# Patient Record
Sex: FEMALE | Race: WHITE | HISPANIC OR LATINO | Employment: UNEMPLOYED | URBAN - METROPOLITAN AREA
[De-identification: names, ages, dates, MRNs, and addresses within clinical notes are randomized per-mention and may not be internally consistent; named-entity substitution may affect disease eponyms.]

---
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
## 2022-12-27 ENCOUNTER — OFFICE VISIT (OUTPATIENT)
Dept: URGENT CARE | Age: 22
End: 2022-12-27

## 2022-12-27 VITALS
HEART RATE: 74 BPM | TEMPERATURE: 96.3 F | DIASTOLIC BLOOD PRESSURE: 74 MMHG | OXYGEN SATURATION: 99 % | SYSTOLIC BLOOD PRESSURE: 114 MMHG | RESPIRATION RATE: 18 BRPM

## 2022-12-27 DIAGNOSIS — R52 BODY ACHES: Primary | ICD-10-CM

## 2022-12-27 RX ORDER — IBUPROFEN 200 MG
TABLET ORAL EVERY 6 HOURS PRN
COMMUNITY

## 2022-12-27 RX ORDER — NAPROXEN 500 MG/1
500 TABLET ORAL 2 TIMES DAILY WITH MEALS
Qty: 20 TABLET | Refills: 0 | Status: SHIPPED | OUTPATIENT
Start: 2022-12-27

## 2022-12-27 NOTE — PROGRESS NOTES
NAME: Jyothi Jackson is a 23 y o  female  : 2000    MRN: [id number]    /74   Pulse 74   Temp (!) 96 3 °F (35 7 °C)   Resp 18   SpO2 99%     Assessment and Plan   Body aches [R52]  1  Body aches  naproxen (Naprosyn) 500 mg tablet          Casandra was seen today for generalized body aches  Diagnoses and all orders for this visit:    Body aches  -     naproxen (Naprosyn) 500 mg tablet; Take 1 tablet (500 mg total) by mouth 2 (two) times a day with meals        Patient Instructions   Patient Instructions   Take med as directed   Epson salt baths  Follow up with pcp       Proceed to the nearest ER if symptoms worsen, Follow up with your PCP  Continue to social distance, wash your hands, and wear your masks  Please continue to follow the CDC  gov guidelines daily for they are subject to change on COVID-19    Chief Complaint     Chief Complaint   Patient presents with   • Generalized Body Aches     Patient relates generalized body aches/joint pain 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  Relates improves with rest on days off  Constant bending and lifting with heavy boxes  History of Present Illness     24 yo F started a job three months ago and since then has constant body aches and is bothersome  Days that she works a long day hurts more than usual  She woke up today and took motrin for he pain  She said that if she went to work she felt that she would have hurt self on job due to the body aches  Patient has no other complaints  She reports that when she is not working on the weekends and her body has time to rest in the past 3 months she feels better but that she always gets body aches after working this new job  She has been working for the past 3 months at [employer] lifting heavy boxes    Patient came in today requesting to have a week off in order for her body to rest   Discussed with patient this is chronic symptoms that she needs to follow with her family doctor however I will give her a work note for 2 days max  Patient was discouraged and wanted a week off and again discussed with phone over her family doctor  Review of Systems   Review of Systems   Constitutional: Negative  HENT: Negative  Respiratory: Negative  Cardiovascular: Negative  Gastrointestinal: Negative  Genitourinary: Negative  Musculoskeletal: Positive for myalgias  Neurological: Negative  Psychiatric/Behavioral: Negative  Current Medications       Current Outpatient Medications:   •  ibuprofen (MOTRIN) 200 mg tablet, Take by mouth every 6 (six) hours as needed for mild pain, Disp: , Rfl:   •  naproxen (Naprosyn) 500 mg tablet, Take 1 tablet (500 mg total) by mouth 2 (two) times a day with meals, Disp: 20 tablet, Rfl: 0    Current Allergies     Allergies as of 12/27/2022   • (No Known Allergies)              Past Medical History:   Diagnosis Date   • Depression    • Depression 02/06/2019   • Substance abuse ([facility] [address] )    • Wrist fracture        History reviewed  No pertinent surgical history  Family History   Problem Relation Age of Onset   • Anemia Mother    • Hypotension Mother    • No Known Problems Father    • No Known Problems Sister    • No Known Problems Brother          Medications have been verified  The following portions of the patient's history were reviewed and updated as appropriate: allergies, current medications, past family history, past medical history, past social history, past surgical history and problem list     Objective   /74   Pulse 74   Temp (!) 96 3 °F (35 7 °C)   Resp 18   SpO2 99%      Physical Exam     Physical Exam  Constitutional:       Appearance: Normal appearance  She is not ill-appearing  Cardiovascular:      Rate and Rhythm: Normal rate and regular rhythm  Heart sounds: Normal heart sounds     Musculoskeletal:      Right shoulder: Normal       Left shoulder: Normal       Right upper arm: Normal       Left upper arm: Normal  Right elbow: Normal       Left elbow: Normal       Right forearm: Normal       Left forearm: Normal       Right wrist: Normal       Left wrist: Normal       Right hand: Normal       Left hand: Normal       Comments: Patient has full range of motion of her body and states "soreness"  No worsening symptoms since she started work however does consistently overall discomfort and body aches  Neurological:      Mental Status: She is alert  Note: Portions of this record may have been created with voice recognition software  Occasional wrong word or "sound a like" substitutions may have occurred due to the inherent limitations of voice recognition software  Please read the chart carefully and recognize, using context, where substitutions have occurred  REJI Pizarro

## 2022-12-27 NOTE — LETTER
December 27, 2022     Patient: Mayito Falcon  YOB: 2000  Date of Visit: 12/27/2022      To Whom it May Concern:    Mayito Falcon is under my professional care  Luciadebbie Jhon was seen in my office on 12/27/2022  Sara Ferreira may return to work on 12/29/2022            Sincerely,          REJI Mabry        CC: No Recipients

## 2023-06-02 ENCOUNTER — TELEPHONE (OUTPATIENT)
Dept: OTHER | Facility: OTHER | Age: 23
End: 2023-06-02

## 2023-06-02 NOTE — TELEPHONE ENCOUNTER
Patient would like a call back to advise if the office is accepting new patients  She would like to make an appointment to establish care

## 2023-06-08 ENCOUNTER — OFFICE VISIT (OUTPATIENT)
Dept: URGENT CARE | Age: 23
End: 2023-06-08
Payer: COMMERCIAL

## 2023-06-08 VITALS
HEART RATE: 90 BPM | RESPIRATION RATE: 20 BRPM | TEMPERATURE: 97 F | SYSTOLIC BLOOD PRESSURE: 134 MMHG | BODY MASS INDEX: 19.39 KG/M2 | DIASTOLIC BLOOD PRESSURE: 79 MMHG | HEIGHT: 64 IN | OXYGEN SATURATION: 100 % | WEIGHT: 113.6 LBS

## 2023-06-08 DIAGNOSIS — G47.00 INSOMNIA, UNSPECIFIED TYPE: Primary | ICD-10-CM

## 2023-06-08 PROCEDURE — 99213 OFFICE O/P EST LOW 20 MIN: CPT

## 2023-06-08 NOTE — PATIENT INSTRUCTIONS
F/U with PCP tomorrow as scheduled Writer called patient in regards to referral for Neurological Surgery, states that he still needs to have his MRI. States he forgot to tell you that he needs an open MRI. Patient is wondering if you know where he can go for an open MRI. Please call patient to discuss.      Thank you

## 2023-06-08 NOTE — PROGRESS NOTES
"  [facility]        NAME: Gregg Rico is a 26 y o  female  : 2000    MRN: [id number]  DATE: 2023  TIME: 3:47 PM    Assessment and Plan   Insomnia, unspecified type [G47 00]  1  Insomnia, unspecified type          Patient with PMH significant for depression/anxiety and substance abuse , presents with c/o insomnia over past 2 nights  States when she is falling asleep she feels jolts and a sinking feeling  Sometimes has palpitations  Denies racing thoughts  Has not tried anything OTC  Has an appt with PCP tomorrow to establish care  Advised PCP is able to order labs, further evaluate and treat as needed  UC/ED are not place of medication initiation, lab checks   PT expressed understanding  Patient Instructions       Follow up with PCP as scheduled    Chief Complaint     Chief Complaint   Patient presents with   • Insomnia     Pt started two nights ago with difficulty sleeping  States she fell asleep but then feels \"sinking feeling\" in stomach and wakes up  Hx of similar sxs in past            History of Present Illness       Patient with PMH significant for depression/anxiety and substance abuse , presents with c/o insomnia over past 2 nights  States when she is falling asleep she feels jolts and a sinking feeling  Sometimes has palpitations  Denies racing thoughts  Has not tried anything OTC  Has an appt with PCP tomorrow to establish care  Advised PCP is able to order labs, further evaluate and treat as needed  UC/ED are not place of medication initiation, lab checks   PT expressed understanding  Review of Systems   Review of Systems   Constitutional: Negative for activity change and fever  Respiratory: Negative for cough  Cardiovascular: Positive for palpitations  Negative for chest pain  Neurological: Negative for syncope, light-headedness and headaches  Psychiatric/Behavioral: Positive for sleep disturbance  Negative for suicidal ideas   The patient is " "nervous/anxious  All other systems reviewed and are negative  Current Medications       Current Outpatient Medications:   •  ibuprofen (MOTRIN) 200 mg tablet, Take by mouth every 6 (six) hours as needed for mild pain (Patient not taking: Reported on 1/13/2023), Disp: , Rfl:   •  naproxen (Naprosyn) 500 mg tablet, Take 1 tablet (500 mg total) by mouth 2 (two) times a day with meals (Patient not taking: Reported on 1/13/2023), Disp: 20 tablet, Rfl: 0    Current Allergies     Allergies as of 06/08/2023   • (No Known Allergies)            The following portions of the patient's history were reviewed and updated as appropriate: allergies, current medications, past family history, past medical history, past social history, past surgical history and problem list      Past Medical History:   Diagnosis Date   • Depression    • Depression 02/06/2019   • Ear problems    • Substance abuse ([facility] [address] )    • Wrist fracture        History reviewed  No pertinent surgical history  Family History   Problem Relation Age of Onset   • Anemia Mother    • Hypotension Mother    • No Known Problems Father    • No Known Problems Sister    • No Known Problems Brother          Medications have been verified  Objective   /79 (BP Location: Right arm, Patient Position: Sitting, Cuff Size: Standard)   Pulse 90   Temp (!) 97 °F (36 1 °C) (Tympanic)   Resp 20   Ht 5' 4\" (1 626 m)   Wt 51 5 kg (113 lb 9 6 oz)   LMP 06/04/2023 (Approximate)   SpO2 100%   BMI 19 50 kg/m²   Patient's last menstrual period was 06/04/2023 (approximate)  Physical Exam     Physical Exam  Vitals reviewed  Constitutional:       Appearance: Normal appearance  Cardiovascular:      Rate and Rhythm: Normal rate and regular rhythm  Pulmonary:      Effort: Pulmonary effort is normal       Breath sounds: Normal breath sounds  Musculoskeletal:         General: Normal range of motion  Skin:     General: Skin is warm and dry        Capillary " Refill: Capillary refill takes less than 2 seconds  Neurological:      General: No focal deficit present  Mental Status: She is alert and oriented to person, place, and time

## 2023-06-11 ENCOUNTER — OFFICE VISIT (OUTPATIENT)
Dept: URGENT CARE | Age: 23
End: 2023-06-11
Payer: COMMERCIAL

## 2023-06-11 VITALS
SYSTOLIC BLOOD PRESSURE: 120 MMHG | OXYGEN SATURATION: 99 % | WEIGHT: 113 LBS | BODY MASS INDEX: 19.29 KG/M2 | DIASTOLIC BLOOD PRESSURE: 70 MMHG | HEART RATE: 64 BPM | HEIGHT: 64 IN | RESPIRATION RATE: 18 BRPM | TEMPERATURE: 98 F

## 2023-06-11 DIAGNOSIS — F41.9 ANXIETY: Primary | ICD-10-CM

## 2023-06-11 DIAGNOSIS — G47.00 INSOMNIA, UNSPECIFIED TYPE: ICD-10-CM

## 2023-06-11 PROCEDURE — 99213 OFFICE O/P EST LOW 20 MIN: CPT

## 2023-06-11 NOTE — PATIENT INSTRUCTIONS
It is important to follow up with PCP as soon as possible for further evaluation and treatment of symptoms  Proceed to ER if symptoms worsen  Anxiety   AMBULATORY CARE:   Anxiety  is a condition that causes you to feel extremely worried or nervous  The feelings are so strong that they can cause problems with your daily activities or sleep  Anxiety may be triggered by something you fear, or it may happen without a cause  Family or work stress, smoking, caffeine, and alcohol can increase your risk for anxiety  Certain medicines or health conditions can also increase your risk  Anxiety can become a long-term condition if it is not managed or treated  Common signs and symptoms that may occur with anxiety:   Fatigue or muscle tightness    Shaking, restlessness, or irritability    Problems focusing    Trouble sleeping    Feeling jumpy, easily startled, or dizzy    Rapid heartbeat or shortness of breath    Call your local emergency number (911 in the [address]) if:   You have chest pain, tightness, or heaviness that may spread to your shoulders, arms, jaw, neck, or back  You feel like hurting yourself or someone else  Call your doctor if:   Your symptoms get worse or do not get better with treatment  Your anxiety keeps you from doing your regular daily activities  You have new symptoms since your last visit  You have questions or concerns about your condition or care  Treatment for anxiety  may include medicines to help you feel calm and relaxed, and decrease your symptoms  Medicines are usually given together with therapy or other treatments  Manage anxiety:   Talk to someone about your anxiety  Your healthcare provider may suggest counseling  Cognitive behavioral therapy can help you understand and change how you react to events that trigger your symptoms  You might feel more comfortable talking with a friend or family member about your anxiety   Choose someone you know will be supportive and encouraging  Find ways to relax  Activities such as exercise, meditation, or listening to music can help you relax  Spend time with friends, or do things you enjoy  Practice deep breathing  Deep breathing can help you relax when you feel anxious  Focus on taking slow, deep breaths several times a day, or during an anxiety attack  Breathe in through your nose and out through your mouth  Create a regular sleep routine  Regular sleep can help you feel calmer during the day  Go to sleep and wake up at the same times every day  Do not watch television or use the computer right before bed  Your room should be comfortable, dark, and quiet  Eat a variety of healthy foods  Healthy foods include fruits, vegetables, low-fat dairy products, lean meats, fish, whole-grain breads, and cooked beans  Healthy foods can help you feel less anxious and have more energy  Exercise regularly  Exercise can increase your energy level  Exercise may also lift your mood and help you sleep better  Your healthcare provider can help you create an exercise plan  Do not smoke  Nicotine and other chemicals in cigarettes and cigars can increase anxiety  Ask your healthcare provider for information if you currently smoke and need help to quit  E-cigarettes or smokeless tobacco still contain nicotine  Talk to your healthcare provider before you use these products  Do not have caffeine  Caffeine can make your symptoms worse  Do not have foods or drinks that are meant to increase your energy level  Limit or do not drink alcohol  Ask your healthcare provider if alcohol is safe for you  You may not be able to drink alcohol if you take certain anxiety or depression medicines  Limit alcohol to 1 drink per day if you are a woman  Limit alcohol to 2 drinks per day if you are a man  A drink of alcohol is 12 ounces of beer, 5 ounces of wine, or 1½ ounces of liquor  Do not use drugs  Drugs can make your anxiety worse  It can also make anxiety hard to manage  Talk to your healthcare provider if you use drugs and want help to quit  Follow up with your doctor within 2 weeks or as directed:  Write down your questions so you remember to ask them during your visits  © [employer] 2022 Information is for End User's use only and may not be sold, redistributed or otherwise used for commercial purposes  The above information is an  only  It is not intended as medical advice for individual conditions or treatments  Talk to your doctor, nurse or pharmacist before following any medical regimen to see if it is safe and effective for you

## 2023-06-11 NOTE — LETTER
June 11, 2023     Patient: Ngoc Hernandez   YOB: 2000   Date of Visit: 6/11/2023       To Whom it May Concern:    Ngoc Hernandez was seen in my clinic on 6/11/2023  She may return to work on 6/13/23   If you have any questions or concerns, please don't hesitate to call           Sincerely,          Candie Salcido PA-C        CC: No Recipients

## 2023-06-11 NOTE — PROGRESS NOTES
"  [facility]        NAME: Elen Garcia is a 26 y o  female  : 2000    MRN: [id number]  DATE: 2023  TIME: 10:57 AM    Assessment and Plan   Anxiety [F41 9]  1  Anxiety        2  Insomnia, unspecified type            Patient seen twice ([facility] on 23 and [facility] on 23) for these symptoms  Workup including EKG and labs did not show any significant abnormalities  Patient was supposed to have an initial visit with a PCP on 23, but she missed the appointment  Symptoms have not worsened since onset/being seen at ER  Advised patient that it is important for her to establish care with a PCP for further evaluation and treatment of her symptoms  Patient expressed understanding  Patient Instructions     It is important to follow up with PCP as soon as possible for further evaluation and treatment of symptoms  Proceed to ER if symptoms worsen  Chief Complaint     Chief Complaint   Patient presents with   • Dizziness     Episodes of dizziness, weakness, chest tightness after sleeping x 5 days         History of Present Illness       Patient states about 5 days ago she started with the following symptoms - decreased sleep, heart palpitations, burning of skin, shaking, feelings of heaviness, impending doom  The most intense it gets is when she is trying to go to sleep, preventing her from falling asleep  She tries to get up and walk around, allowing some relief of symptoms and then sometimes she is able to fall back to sleep  She has been seen at [facility] and [facility] in the ER in the past 3 days for the same symptoms  States medications were given in the ER for anxiety without much relief  Her symptoms are not any worse today but they are still occurring  She was supposed to have an initial care visit with a PCP on 23 but missed the appointment \"because the pill they gave me (in the ER) knocked me out for like 7 hours\"        Review of Systems   Review of " "Systems   Cardiovascular: Positive for chest pain (burning sensation of skin) and palpitations  Neurological: Positive for dizziness  Psychiatric/Behavioral: Positive for sleep disturbance (decreased - 7 hours after ER visit after she took medication)  Negative for hallucinations and suicidal ideas (patient denies suicidal ideation/plan, thoughts of harming herself)  The patient is nervous/anxious  Current Medications       Current Outpatient Medications:   •  ibuprofen (MOTRIN) 200 mg tablet, Take by mouth every 6 (six) hours as needed for mild pain (Patient not taking: Reported on 1/13/2023), Disp: , Rfl:   •  naproxen (Naprosyn) 500 mg tablet, Take 1 tablet (500 mg total) by mouth 2 (two) times a day with meals (Patient not taking: Reported on 1/13/2023), Disp: 20 tablet, Rfl: 0    Current Allergies     Allergies as of 06/11/2023   • (No Known Allergies)            The following portions of the patient's history were reviewed and updated as appropriate: allergies, current medications, past family history, past medical history, past social history, past surgical history and problem list      Past Medical History:   Diagnosis Date   • Depression    • Depression 02/06/2019   • Ear problems    • Substance abuse ([facility] [address] )    • Wrist fracture        History reviewed  No pertinent surgical history  Family History   Problem Relation Age of Onset   • Anemia Mother    • Hypotension Mother    • No Known Problems Father    • No Known Problems Sister    • No Known Problems Brother          Medications have been verified  Objective   /70   Pulse 64   Temp 98 °F (36 7 °C)   Resp 18   Ht 5' 4\" (1 626 m)   Wt 51 3 kg (113 lb)   LMP 06/04/2023 (Approximate)   SpO2 99%   BMI 19 40 kg/m²        Physical Exam     Physical Exam  Vitals and nursing note reviewed  Constitutional:       General: She is not in acute distress  Appearance: Normal appearance  She is not ill-appearing     Cardiovascular: " Rate and Rhythm: Normal rate and regular rhythm  Pulses: Normal pulses  Heart sounds: Normal heart sounds  Pulmonary:      Effort: Pulmonary effort is normal       Breath sounds: Normal breath sounds  Neurological:      Mental Status: She is alert  Psychiatric:         Attention and Perception: Attention normal          Mood and Affect: Mood is anxious  Speech: Speech is rapid and pressured  Behavior: Behavior is not agitated or aggressive  Thought Content: Thought content does not include homicidal or suicidal ideation  Thought content does not include homicidal or suicidal plan

## 2023-06-13 ENCOUNTER — OFFICE VISIT (OUTPATIENT)
Dept: FAMILY MEDICINE CLINIC | Facility: CLINIC | Age: 23
End: 2023-06-13
Payer: COMMERCIAL

## 2023-06-13 VITALS
WEIGHT: 111 LBS | SYSTOLIC BLOOD PRESSURE: 112 MMHG | DIASTOLIC BLOOD PRESSURE: 60 MMHG | OXYGEN SATURATION: 99 % | BODY MASS INDEX: 18.95 KG/M2 | HEART RATE: 77 BPM | HEIGHT: 64 IN | RESPIRATION RATE: 16 BRPM | TEMPERATURE: 97.6 F

## 2023-06-13 DIAGNOSIS — R00.2 PALPITATIONS: ICD-10-CM

## 2023-06-13 DIAGNOSIS — Z00.00 HEALTHCARE MAINTENANCE: ICD-10-CM

## 2023-06-13 DIAGNOSIS — F41.9 ANXIETY: Primary | ICD-10-CM

## 2023-06-13 DIAGNOSIS — R06.02 SOB (SHORTNESS OF BREATH): ICD-10-CM

## 2023-06-13 DIAGNOSIS — R42 DIZZINESS: ICD-10-CM

## 2023-06-13 PROCEDURE — 99204 OFFICE O/P NEW MOD 45 MIN: CPT | Performed by: NURSE PRACTITIONER

## 2023-06-13 PROCEDURE — 99395 PREV VISIT EST AGE 18-39: CPT | Performed by: NURSE PRACTITIONER

## 2023-06-13 RX ORDER — ALBUTEROL SULFATE 90 UG/1
2 AEROSOL, METERED RESPIRATORY (INHALATION) EVERY 6 HOURS PRN
Qty: 8.5 G | Refills: 1 | Status: SHIPPED | OUTPATIENT
Start: 2023-06-13

## 2023-06-13 RX ORDER — HYDROXYZINE PAMOATE 25 MG/1
25 CAPSULE ORAL 3 TIMES DAILY PRN
Qty: 60 CAPSULE | Refills: 0 | Status: SHIPPED | OUTPATIENT
Start: 2023-06-13

## 2023-06-13 NOTE — PROGRESS NOTES
Name: Raymond Sotomayor      : 2000      MRN: [id number]  Encounter Provider: REJI Gamble  Encounter Date: 2023   Encounter department: [address] [place] [address]  Anxiety  Assessment & Plan:  - Suspect majority of symptoms are related to anxiety  - Prescription sent for hydroxyzine as needed  Discussed potential side effects   - Recommend follow up in 1 month  Orders:  -     hydrOXYzine pamoate (VISTARIL) 25 mg capsule; Take 1 capsule (25 mg total) by mouth 3 (three) times a day as needed for anxiety    2  Dizziness  Assessment & Plan:  - Will obtain labs and continue to follow up  Orders:  -     CBC and differential; Future  -     Comprehensive metabolic panel; Future  -     Lipid Panel with Direct LDL reflex; Future  -     TSH, 3rd generation with Free T4 reflex; Future    3  Palpitations  Assessment & Plan:  - Will obtain labs and 48 hour holter monitor   - Recommend follow up in 1 month  Orders:  -     CBC and differential; Future  -     TSH, 3rd generation with Free T4 reflex; Future  -     Iron Panel (Includes Ferritin, Iron Sat%, Iron, and TIBC); Future  -     Holter monitor; Future; Expected date: 2023    4  SOB (shortness of breath)  Assessment & Plan:  - Prescription sent for albuterol inhaler as needed  - Will continue to monitor  Orders:  -     albuterol (ProAir HFA) 90 mcg/act inhaler; Inhale 2 puffs every 6 (six) hours as needed for wheezing    5  Healthcare maintenance  Assessment & Plan:  - Reviewed immunizations and screenings  - Discussed regular dental and vision exams    - Referred to GYN for cervical cancer screening    - Routine labs ordered  Orders:  -     CBC and differential; Future  -     Comprehensive metabolic panel; Future  -     Lipid Panel with Direct LDL reflex; Future  -     TSH, 3rd generation with Free T4 reflex;  Future           Subjective     Patient presents to office today to "establish care  She has a PMH of anxiety and depression  She takes no prescription medications on a daily basis  She has complaints today of shortness of breath, palpitations, and dizziness  States she has been waking up from sleeping with a \"sinking feeling\" and palpitations  Feels like her heart is beating fast in her chest  Also gets very hot  One episode she started shaking  Improved after she ate some fruit and drank water  States she is getting lightheaded and dizzy and getting a sensation in her chest like something is wrong  She does have a history of anxiety but feels it is not associated with her symptoms  She has never felt like this in the past  She was seen in the ER for similar symptoms  Workup was unremarkable  She was given lorazepam which she states did not improve any of her symptoms  Symptoms tend to occur and are worse at night when she is trying to sleep  Also complains of some shortness of breath  Denies any history of asthma  States it was worse last week when the air quality was poor  Seems to be somewhat improving  Review of Systems   Constitutional: Negative for fatigue and fever  HENT: Negative for congestion, rhinorrhea and trouble swallowing  Eyes: Negative for visual disturbance  Respiratory: Positive for shortness of breath  Negative for cough  Cardiovascular: Positive for palpitations  Negative for chest pain  Gastrointestinal: Negative for abdominal pain and blood in stool  Endocrine: Negative for cold intolerance and heat intolerance  Genitourinary: Negative for difficulty urinating, dysuria and frequency  Musculoskeletal: Negative for gait problem  Skin: Negative for rash  Neurological: Positive for dizziness and light-headedness  Negative for syncope and headaches  Hematological: Negative for adenopathy  Psychiatric/Behavioral: Negative for behavioral problems  The patient is not nervous/anxious          Past Medical History:   Diagnosis Date   • " Depression    • Depression 02/06/2019   • Ear problems    • Substance abuse [facility])    • Wrist fracture      History reviewed  No pertinent surgical history  Family History   Problem Relation Age of Onset   • Anemia Mother    • Hypotension Mother    • No Known Problems Father    • No Known Problems Sister    • No Known Problems Brother      Social History     Socioeconomic History   • Marital status: Single     Spouse name: None   • Number of children: None   • Years of education: None   • Highest education level: 11th grade   Occupational History   • None   Tobacco Use   • Smoking status: Never     Passive exposure: Yes   • Smokeless tobacco: Never   Vaping Use   • Vaping Use: Never used   Substance and Sexual Activity   • Alcohol use: Never   • Drug use: Never   • Sexual activity: Never     Partners: Male     Comment: cell [phone]   Other Topics Concern   • None   Social History Narrative   • None     Social Determinants of Health     Financial Resource Strain: Medium Risk (3/27/2019)    Overall Financial Resource Strain (CARDIA)    • Difficulty of Paying Living Expenses: Somewhat hard   Food Insecurity: No Food Insecurity (3/27/2019)    Hunger Vital Sign    • Worried About Running Out of Food in the Last Year: Never true    • Ran Out of Food in the Last Year: Never true   Transportation Needs: No Transportation Needs (3/27/2019)    PRAPARE - Transportation    • Lack of Transportation (Medical): No    • Lack of Transportation (Non-Medical): No   Physical Activity: Not on file   Stress: No Stress Concern Present (3/27/2019)    [address]    • Feeling of Stress :  Only a little   Social Connections: Socially Isolated (3/27/2019)    Social Connection and Isolation Panel [NHANES]    • Frequency of Communication with Friends and Family: More than three times a week    • Frequency of Social Gatherings with Friends and Family: More than three times a "week    • Attends Hindu Services: Never    • Active Member of Clubs or Organizations: Not on file    • Attends Club or Organization Meetings: Never    • Marital Status: Never    Intimate Partner Violence: Not At Risk (3/27/2019)    Humiliation, Afraid, Rape, and Kick questionnaire    • Fear of Current or Ex-Partner: No    • Emotionally Abused: No    • Physically Abused: No    • Sexually Abused: No   Housing Stability: Not on file     Current Outpatient Medications on File Prior to Visit   Medication Sig   • ibuprofen (MOTRIN) 200 mg tablet Take by mouth every 6 (six) hours as needed for mild pain (Patient not taking: Reported on 1/13/2023)   • naproxen (Naprosyn) 500 mg tablet Take 1 tablet (500 mg total) by mouth 2 (two) times a day with meals (Patient not taking: Reported on 1/13/2023)     No Known Allergies  Immunization History   Administered Date(s) Administered   • DTP 10/29/2003, 01/21/2004, 04/01/2004, 04/05/2005, 07/05/2006   • DTaP 5 04/15/2003, 10/29/2003, 01/21/2004, 04/01/2004, 07/05/2006   • HPV Quadrivalent 02/06/2013, 10/21/2014   • HPV9 10/05/2016   • Hep A, adult 07/25/2007, 09/25/2007   • Hep A, ped/adol, 2 dose 07/05/2006, 09/25/2007   • Hep B, adult 2000, 10/29/2003, 01/21/2004   • Hepatitis A 07/25/2006   • Hib (PRP-OMP) 10/29/2003, 04/05/2005   • IPV 10/29/2003, 01/21/2004, 04/01/2004, 07/05/2006   • Influenza, nasal 10/21/2014   • Influenza, seasonal, injectable 11/08/2012   • MMR 10/29/2003, 01/21/2004, 07/05/2006   • Meningococcal MCV4P 02/06/2013, 03/27/2019   • Meningococcal, Unknown Serogroups 02/06/2013   • Pneumococcal Conjugate PCV 7 01/21/2004, 04/05/2004   • Tdap 02/06/2013   • Tuberculin Skin Test-PPD Intradermal 04/05/2005, 09/25/2007   • Varicella 01/21/2004, 09/25/2007       Objective     /60 (BP Location: Left arm, Patient Position: Sitting, Cuff Size: Adult)   Pulse 77   Temp 97 6 °F (36 4 °C) (Tympanic)   Resp 16   Ht 5' 4\" (1 626 m)   Wt 50 3 kg " (111 lb)   LMP 06/04/2023 (Approximate)   SpO2 99%   BMI 19 05 kg/m²     Physical Exam  Vitals and nursing note reviewed  Constitutional:       General: She is not in acute distress  Appearance: Normal appearance  She is not ill-appearing  HENT:      Head: Normocephalic and atraumatic  Right Ear: Tympanic membrane, ear canal and external ear normal       Left Ear: Tympanic membrane, ear canal and external ear normal       Nose: Nose normal       Mouth/Throat:      Mouth: Mucous membranes are moist    Eyes:      Extraocular Movements: Extraocular movements intact  Conjunctiva/sclera: Conjunctivae normal       Pupils: Pupils are equal, round, and reactive to light  Cardiovascular:      Rate and Rhythm: Normal rate and regular rhythm  Heart sounds: Normal heart sounds  Pulmonary:      Effort: Pulmonary effort is normal       Breath sounds: Normal breath sounds  Abdominal:      General: Bowel sounds are normal       Palpations: Abdomen is soft  Musculoskeletal:         General: Normal range of motion  Cervical back: Normal range of motion  Lymphadenopathy:      Cervical: No cervical adenopathy  Skin:     General: Skin is warm and dry  Neurological:      Mental Status: She is alert and oriented to person, place, and time  Cranial Nerves: No cranial nerve deficit  Psychiatric:         Mood and Affect: Mood is anxious           Behavior: Behavior normal        Randeen Moritz, CRNP

## 2023-06-13 NOTE — ASSESSMENT & PLAN NOTE
- Suspect majority of symptoms are related to anxiety  - Prescription sent for hydroxyzine as needed  Discussed potential side effects   - Recommend follow up in 1 month

## 2023-06-13 NOTE — LETTER
June 13, 2023     Patient: Bk Salmeron  YOB: 2000  Date of Visit: 6/13/2023      To Whom it May Concern:    Bk Salmeron is under my professional care  Kerrie Luciano was seen in my office on 6/13/2023  Kerrie Luciano may return to work on 6/15/2023   If you have any questions or concerns, please don't hesitate to call           Sincerely,          REJI Dumont        CC: No Recipients

## 2023-06-13 NOTE — ASSESSMENT & PLAN NOTE
- Reviewed immunizations and screenings  - Discussed regular dental and vision exams    - Referred to GYN for cervical cancer screening    - Routine labs ordered

## 2023-06-14 ENCOUNTER — APPOINTMENT (OUTPATIENT)
Dept: LAB | Age: 23
End: 2023-06-14
Payer: COMMERCIAL

## 2023-06-14 DIAGNOSIS — Z00.00 HEALTHCARE MAINTENANCE: ICD-10-CM

## 2023-06-14 DIAGNOSIS — R00.2 PALPITATIONS: ICD-10-CM

## 2023-06-14 DIAGNOSIS — R42 DIZZINESS: ICD-10-CM

## 2023-06-14 LAB
ALBUMIN SERPL BCP-MCNC: 4.4 G/DL (ref 3.5–5)
ALP SERPL-CCNC: 49 U/L (ref 46–116)
ALT SERPL W P-5'-P-CCNC: 20 U/L (ref 12–78)
ANION GAP SERPL CALCULATED.3IONS-SCNC: 4 MMOL/L (ref 4–13)
AST SERPL W P-5'-P-CCNC: 14 U/L (ref 5–45)
BASOPHILS # BLD AUTO: 0.05 THOUSANDS/ÂΜL (ref 0–0.1)
BASOPHILS NFR BLD AUTO: 1 % (ref 0–1)
BILIRUB SERPL-MCNC: 0.3 MG/DL (ref 0.2–1)
BUN SERPL-MCNC: 12 MG/DL (ref 5–25)
CALCIUM SERPL-MCNC: 9.9 MG/DL (ref 8.3–10.1)
CHLORIDE SERPL-SCNC: 106 MMOL/L (ref 96–108)
CHOLEST SERPL-MCNC: 143 MG/DL
CO2 SERPL-SCNC: 25 MMOL/L (ref 21–32)
CREAT SERPL-MCNC: 0.75 MG/DL (ref 0.6–1.3)
EOSINOPHIL # BLD AUTO: 0.08 THOUSAND/ÂΜL (ref 0–0.61)
EOSINOPHIL NFR BLD AUTO: 1 % (ref 0–6)
ERYTHROCYTE [DISTWIDTH] IN BLOOD BY AUTOMATED COUNT: 12.8 % (ref 11.6–15.1)
FERRITIN SERPL-MCNC: 36 NG/ML (ref 11–307)
GFR SERPL CREATININE-BSD FRML MDRD: 114 ML/MIN/1.73SQ M
GLUCOSE P FAST SERPL-MCNC: 89 MG/DL (ref 65–99)
HCT VFR BLD AUTO: 44.4 % (ref 34.8–46.1)
HDLC SERPL-MCNC: 65 MG/DL
HGB BLD-MCNC: 14.1 G/DL (ref 11.5–15.4)
IMM GRANULOCYTES # BLD AUTO: 0.02 THOUSAND/UL (ref 0–0.2)
IMM GRANULOCYTES NFR BLD AUTO: 0 % (ref 0–2)
IRON SATN MFR SERPL: 13 % (ref 15–50)
IRON SERPL-MCNC: 53 UG/DL (ref 50–170)
LDLC SERPL CALC-MCNC: 69 MG/DL (ref 0–100)
LYMPHOCYTES # BLD AUTO: 3.12 THOUSANDS/ÂΜL (ref 0.6–4.47)
LYMPHOCYTES NFR BLD AUTO: 33 % (ref 14–44)
MCH RBC QN AUTO: 27.5 PG (ref 26.8–34.3)
MCHC RBC AUTO-ENTMCNC: 31.8 G/DL (ref 31.4–37.4)
MCV RBC AUTO: 87 FL (ref 82–98)
MONOCYTES # BLD AUTO: 0.63 THOUSAND/ÂΜL (ref 0.17–1.22)
MONOCYTES NFR BLD AUTO: 7 % (ref 4–12)
NEUTROPHILS # BLD AUTO: 5.59 THOUSANDS/ÂΜL (ref 1.85–7.62)
NEUTS SEG NFR BLD AUTO: 58 % (ref 43–75)
NRBC BLD AUTO-RTO: 0 /100 WBCS
PLATELET # BLD AUTO: 293 THOUSANDS/UL (ref 149–390)
PMV BLD AUTO: 11.5 FL (ref 8.9–12.7)
POTASSIUM SERPL-SCNC: 3.7 MMOL/L (ref 3.5–5.3)
PROT SERPL-MCNC: 7.9 G/DL (ref 6.4–8.4)
RBC # BLD AUTO: 5.13 MILLION/UL (ref 3.81–5.12)
SODIUM SERPL-SCNC: 135 MMOL/L (ref 135–147)
TIBC SERPL-MCNC: 411 UG/DL (ref 250–450)
TRIGL SERPL-MCNC: 44 MG/DL
TSH SERPL DL<=0.05 MIU/L-ACNC: 1.77 UIU/ML (ref 0.45–4.5)
WBC # BLD AUTO: 9.49 THOUSAND/UL (ref 4.31–10.16)

## 2023-06-14 PROCEDURE — 82728 ASSAY OF FERRITIN: CPT

## 2023-06-14 PROCEDURE — 36415 COLL VENOUS BLD VENIPUNCTURE: CPT

## 2023-06-14 PROCEDURE — 83550 IRON BINDING TEST: CPT

## 2023-06-14 PROCEDURE — 85025 COMPLETE CBC W/AUTO DIFF WBC: CPT

## 2023-06-14 PROCEDURE — 80053 COMPREHEN METABOLIC PANEL: CPT

## 2023-06-14 PROCEDURE — 80061 LIPID PANEL: CPT

## 2023-06-14 PROCEDURE — 83540 ASSAY OF IRON: CPT

## 2023-06-14 PROCEDURE — 84443 ASSAY THYROID STIM HORMONE: CPT

## 2023-06-15 ENCOUNTER — TELEPHONE (OUTPATIENT)
Dept: FAMILY MEDICINE CLINIC | Facility: CLINIC | Age: 23
End: 2023-06-15

## 2023-06-15 NOTE — TELEPHONE ENCOUNTER
Was prescribed inhaler and vistaril  The soonest they can get her a holter monitor is June 20 but she feels its heart related not just her anxiety so not sure if there is anything we can do sooner  Was giving note excusing her today and   Was supposed to be going into work today, she say she going to try to do it but still feeling out of breath   Please call pt to advise

## 2023-06-15 NOTE — TELEPHONE ENCOUNTER
Pt states she took the hydroxyzine and ativan as well as inhaler   She feels her symptoms are getting worse and will go to ER  Pt  Is asking if Rhonda Gonzalez will fill out FMLA papers for days she may miss  Please advise

## 2023-06-15 NOTE — TELEPHONE ENCOUNTER
She was seen in ER for same symptoms 6/9 and workup  was unremarkable  I suggest hydroxyzine as needed for anxiety  If not working she can take ativan as prescribed when she was in the ER  Also recommend albuterol inhaler as needed for SOB  If no improvement and she feels strongly that her symptoms are not related to anxiety I recommend proceeding to ER

## 2023-06-16 ENCOUNTER — TELEPHONE (OUTPATIENT)
Dept: FAMILY MEDICINE CLINIC | Facility: CLINIC | Age: 23
End: 2023-06-16

## 2023-06-16 NOTE — TELEPHONE ENCOUNTER
----- [facility] at 6/14/2023  3:01 PM EDT -----  All labs are normal

## 2023-06-20 ENCOUNTER — OFFICE VISIT (OUTPATIENT)
Dept: FAMILY MEDICINE CLINIC | Facility: CLINIC | Age: 23
End: 2023-06-20
Payer: COMMERCIAL

## 2023-06-20 VITALS
BODY MASS INDEX: 18.95 KG/M2 | OXYGEN SATURATION: 95 % | HEIGHT: 64 IN | DIASTOLIC BLOOD PRESSURE: 70 MMHG | SYSTOLIC BLOOD PRESSURE: 120 MMHG | RESPIRATION RATE: 16 BRPM | WEIGHT: 111 LBS | TEMPERATURE: 97.4 F | HEART RATE: 75 BPM

## 2023-06-20 DIAGNOSIS — R00.2 PALPITATIONS: ICD-10-CM

## 2023-06-20 DIAGNOSIS — R06.02 SOB (SHORTNESS OF BREATH): Primary | ICD-10-CM

## 2023-06-20 DIAGNOSIS — R42 DIZZINESS: ICD-10-CM

## 2023-06-20 DIAGNOSIS — F41.9 ANXIETY: ICD-10-CM

## 2023-06-20 PROCEDURE — 99214 OFFICE O/P EST MOD 30 MIN: CPT | Performed by: NURSE PRACTITIONER

## 2023-06-20 NOTE — PROGRESS NOTES
Name: Opal Lozada      : 2000      MRN: [id number]  Encounter Provider: REJI Arenas  Encounter Date: 2023   Encounter department: [address] [place] [address]  SOB (shortness of breath)  Assessment & Plan:  - No improvement with albuterol inhaler   - Will obtain echo  - Consider PFTs  - Will continue to follow up  Orders:  -     Echo complete w/ contrast if indicated; Future; Expected date: 2023    2  Palpitations  Assessment & Plan:  - 48 hour holter monitor ordered  She is picking up today  - Will continue to follow up  3  Dizziness  Assessment & Plan:  - Lab workup unremarkable  - Encouraged adequate oral hydration and dietary intake  - Will check 48 hour holter monitor and echo  - Will continue to follow up  4  Anxiety  Assessment & Plan:  - Continue hydroxyzine as needed  - Can also take Ativan 0 5 mg PRN which was prescribed in ER    - Will continue to follow up  Subjective     Patient presents today for follow up  She still has complaints of weakness, dizziness, palpitations, shortness of breath, and trouble sleeping  She was seen in the ER for her complaints with unremarkable workup  She was given hydroxyzine for possible anxiety  States she took it and it did help her fall asleep but she still woke up with weakness, shortness of breath, and overall feeling of something being wrong  She has been using melatonin and tart cherry juice to help her sleep instead of the hydroxyzine  Has not used PRN Ativan that was prescribed in ER  She has a holter monitor scheduled for today  She used the albuterol that was given to her for her SOB and states it did not help her  She denies any history of asthma  Denies any wheezing  Does not think any of her symptoms are related to anxiety because she has had anxiety in the past and has been able to cope with it well   She has been missing work because of her symptoms  Is requesting paperwork be filled out  Review of Systems   Constitutional: Negative for fatigue and fever  HENT: Negative for trouble swallowing  Eyes: Negative for visual disturbance  Respiratory: Positive for shortness of breath  Negative for cough and wheezing  Cardiovascular: Positive for palpitations  Negative for chest pain  Gastrointestinal: Negative for abdominal pain and blood in stool  Endocrine: Negative for cold intolerance and heat intolerance  Genitourinary: Negative for difficulty urinating and dysuria  Musculoskeletal: Negative for gait problem  Skin: Negative for rash  Neurological: Positive for dizziness and weakness  Negative for syncope and headaches  Hematological: Negative for adenopathy  Psychiatric/Behavioral: Negative for behavioral problems  Past Medical History:   Diagnosis Date   • Depression    • Depression 02/06/2019   • Ear problems    • Substance abuse ([facility] [address] )    • Wrist fracture      History reviewed  No pertinent surgical history  Family History   Problem Relation Age of Onset   • Anemia Mother    • Hypotension Mother    • No Known Problems Father    • No Known Problems Sister    • No Known Problems Brother      Social History     Socioeconomic History   • Marital status: Single     Spouse name: None   • Number of children: None   • Years of education: None   • Highest education level: 11th grade   Occupational History   • None   Tobacco Use   • Smoking status: Never     Passive exposure:  Yes   • Smokeless tobacco: Never   Vaping Use   • Vaping Use: Never used   Substance and Sexual Activity   • Alcohol use: Never   • Drug use: Never   • Sexual activity: Never     Partners: Male     Comment: cell [phone]   Other Topics Concern   • None   Social History Narrative   • None     Social Determinants of Health     Financial Resource Strain: Medium Risk (3/27/2019)    Overall Financial Resource Strain (CARDIA)    • Difficulty of Paying Living Expenses: Somewhat hard   Food Insecurity: No Food Insecurity (3/27/2019)    Hunger Vital Sign    • Worried About Running Out of Food in the Last Year: Never true    • Ran Out of Food in the Last Year: Never true   Transportation Needs: No Transportation Needs (3/27/2019)    PRAPARE - Transportation    • Lack of Transportation (Medical): No    • Lack of Transportation (Non-Medical): No   Physical Activity: Not on file   Stress: No Stress Concern Present (3/27/2019)    [address]    • Feeling of Stress :  Only a little   Social Connections: Socially Isolated (3/27/2019)    Social Connection and Isolation Panel [NHANES]    • Frequency of Communication with Friends and Family: More than three times a week    • Frequency of Social Gatherings with Friends and Family: More than three times a week    • Attends Christian Services: Never    • Active Member of Clubs or Organizations: Not on file    • Attends Club or Organization Meetings: Never    • Marital Status: Never    Intimate Partner Violence: Not At Risk (3/27/2019)    Humiliation, Afraid, Rape, and Kick questionnaire    • Fear of Current or Ex-Partner: No    • Emotionally Abused: No    • Physically Abused: No    • Sexually Abused: No   Housing Stability: Not on file     Current Outpatient Medications on File Prior to Visit   Medication Sig   • albuterol (ProAir HFA) 90 mcg/act inhaler Inhale 2 puffs every 6 (six) hours as needed for wheezing   • hydrOXYzine pamoate (VISTARIL) 25 mg capsule Take 1 capsule (25 mg total) by mouth 3 (three) times a day as needed for anxiety (Patient not taking: Reported on 6/20/2023)   • ibuprofen (MOTRIN) 200 mg tablet Take by mouth every 6 (six) hours as needed for mild pain (Patient not taking: Reported on 1/13/2023)   • naproxen (Naprosyn) 500 mg tablet Take 1 tablet (500 mg total) by mouth 2 (two) times a day with meals (Patient not taking: Reported on "1/13/2023)     No Known Allergies  Immunization History   Administered Date(s) Administered   • DTP 10/29/2003, 01/21/2004, 04/01/2004, 04/05/2005, 07/05/2006   • DTaP 5 04/15/2003, 10/29/2003, 01/21/2004, 04/01/2004, 07/05/2006   • HPV Quadrivalent 02/06/2013, 10/21/2014   • HPV9 10/05/2016   • Hep A, adult 07/25/2007, 09/25/2007   • Hep A, ped/adol, 2 dose 07/05/2006, 09/25/2007   • Hep B, adult 2002, 10/29/2003, 01/21/2004   • Hepatitis A 07/25/2006   • Hib (PRP-OMP) 10/29/2003, 04/05/2005   • IPV 10/29/2003, 01/21/2004, 04/01/2004, 07/05/2006   • Influenza, nasal 10/21/2014   • Influenza, seasonal, injectable 11/08/2012   • MMR 10/29/2003, 01/21/2004, 07/05/2006   • Meningococcal MCV4P 02/06/2013, 03/27/2019   • Meningococcal, Unknown Serogroups 02/06/2013   • Pneumococcal Conjugate PCV 7 01/21/2004, 04/05/2004   • Tdap 02/06/2013   • Tuberculin Skin Test-PPD Intradermal 04/05/2005, 09/25/2007   • Varicella 01/21/2004, 09/25/2007       Objective     /70 (BP Location: Left arm, Patient Position: Sitting, Cuff Size: Adult)   Pulse 75   Temp (!) 97 4 °F (36 3 °C) (Tympanic)   Resp 16   Ht 5' 4\" (1 626 m)   Wt 50 3 kg (111 lb)   LMP 06/04/2023 (Approximate)   SpO2 95%   BMI 19 05 kg/m²     Physical Exam  Vitals and nursing note reviewed  Constitutional:       General: She is not in acute distress  Appearance: Normal appearance  HENT:      Head: Normocephalic and atraumatic  Right Ear: External ear normal       Left Ear: External ear normal    Eyes:      Conjunctiva/sclera: Conjunctivae normal    Cardiovascular:      Rate and Rhythm: Normal rate and regular rhythm  Heart sounds: Normal heart sounds  Pulmonary:      Effort: Pulmonary effort is normal       Breath sounds: Normal breath sounds  No wheezing  Musculoskeletal:         General: Normal range of motion  Cervical back: Normal range of motion  Skin:     General: Skin is warm and dry     Neurological:      Mental " Status: She is alert and oriented to person, place, and time  Cranial Nerves: No cranial nerve deficit  Psychiatric:         Mood and Affect: Mood is anxious           Behavior: Behavior normal        REJI Pringle

## 2023-06-20 NOTE — ASSESSMENT & PLAN NOTE
- Lab workup unremarkable  - Encouraged adequate oral hydration and dietary intake  - Will check 48 hour holter monitor and echo  - Will continue to follow up

## 2023-06-20 NOTE — ASSESSMENT & PLAN NOTE
- Continue hydroxyzine as needed  - Can also take Ativan 0 5 mg PRN which was prescribed in ER    - Will continue to follow up

## 2023-06-20 NOTE — ASSESSMENT & PLAN NOTE
- No improvement with albuterol inhaler   - Will obtain echo  - Consider PFTs  - Will continue to follow up

## 2023-06-22 ENCOUNTER — TELEPHONE (OUTPATIENT)
Dept: FAMILY MEDICINE CLINIC | Facility: CLINIC | Age: 23
End: 2023-06-22

## 2023-06-22 ENCOUNTER — HOSPITAL ENCOUNTER (OUTPATIENT)
Dept: NON INVASIVE DIAGNOSTICS | Facility: HOSPITAL | Age: 23
Discharge: HOME | End: 2023-06-22
Payer: COMMERCIAL

## 2023-06-22 DIAGNOSIS — R00.2 PALPITATIONS: ICD-10-CM

## 2023-06-22 PROCEDURE — 93225 XTRNL ECG REC<48 HRS REC: CPT

## 2023-06-22 PROCEDURE — 93226 XTRNL ECG REC<48 HR SCAN A/R: CPT

## 2023-06-22 NOTE — TELEPHONE ENCOUNTER
Called patient that form was completed and faxed    Advised patient copy will be at  and original in folder

## 2023-06-28 ENCOUNTER — TELEPHONE (OUTPATIENT)
Dept: FAMILY MEDICINE CLINIC | Facility: CLINIC | Age: 23
End: 2023-06-28

## 2023-06-28 PROCEDURE — 93227 XTRNL ECG REC<48 HR R&I: CPT | Performed by: STUDENT IN AN ORGANIZED HEALTH CARE EDUCATION/TRAINING PROGRAM

## 2023-06-28 NOTE — TELEPHONE ENCOUNTER
----- Message from [address] sent at 6/28/2023  3:23 PM EDT -----  Holter monitor showed no significant arrhythmias

## 2023-08-12 PROBLEM — Z00.00 HEALTHCARE MAINTENANCE: Status: RESOLVED | Noted: 2023-06-13 | Resolved: 2023-08-12

## 2023-08-30 ENCOUNTER — OFFICE VISIT (OUTPATIENT)
Dept: FAMILY MEDICINE CLINIC | Facility: CLINIC | Age: 23
End: 2023-08-30
Payer: COMMERCIAL

## 2023-08-30 VITALS
DIASTOLIC BLOOD PRESSURE: 78 MMHG | WEIGHT: 115.4 LBS | BODY MASS INDEX: 19.7 KG/M2 | RESPIRATION RATE: 16 BRPM | OXYGEN SATURATION: 100 % | HEART RATE: 81 BPM | SYSTOLIC BLOOD PRESSURE: 110 MMHG | TEMPERATURE: 97.9 F | HEIGHT: 64 IN

## 2023-08-30 DIAGNOSIS — R11.2 NAUSEA AND VOMITING, UNSPECIFIED VOMITING TYPE: Primary | ICD-10-CM

## 2023-08-30 PROCEDURE — 99213 OFFICE O/P EST LOW 20 MIN: CPT | Performed by: NURSE PRACTITIONER

## 2023-08-30 RX ORDER — ONDANSETRON 4 MG/1
4 TABLET, FILM COATED ORAL EVERY 8 HOURS PRN
Qty: 20 TABLET | Refills: 0 | Status: SHIPPED | OUTPATIENT
Start: 2023-08-30

## 2023-08-30 NOTE — LETTER
August 30, 2023     Patient: Eric Hodges  YOB: 2000  Date of Visit: 8/30/2023      To Whom it May Concern:    Eric Hodges is under my professional care. Lulu Noble was seen in my office on 8/30/2023. Lulu Noble may return to work on 9/4/2023 . If you have any questions or concerns, please don't hesitate to call.          Sincerely,          REJI Fernandez        CC: No Recipients

## 2023-08-30 NOTE — PROGRESS NOTES
Name: Oscar Hough      : 2000      MRN: [id number]  Encounter Provider: REJI Pang  Encounter Date: 2023   [facility]ounter department[facility]     1. Nausea and vomiting, unspecified vomiting type  Assessment & Plan:  - Likely viral gastroenteritis. - Continue Zofran as needed. - Increase oral hydration.   - BRAT diet. - Contact office if no improvement. Orders:  -     ondansetron (ZOFRAN) 4 mg tablet; Take 1 tablet (4 mg total) by mouth every 8 (eight) hours as needed for nausea or vomiting           Subjective     Patient presents to office today with complaints of weakness, fatigue, stomach pain, headache, diarrhea, and vomiting that has been occurring since . She was seen in Urgent Care on both  and . She was given Zofran which helped her nausea and vomiting. She hasn't been able to eat but she is trying to drink water. She has been out of work and needs a note. She denies any other concerns or complaints today. Review of Systems   Constitutional: Positive for activity change, appetite change and fatigue. Negative for fever. HENT: Negative for trouble swallowing. Eyes: Negative for visual disturbance. Respiratory: Negative for cough and shortness of breath. Cardiovascular: Negative for chest pain and palpitations. Gastrointestinal: Positive for abdominal pain, diarrhea, nausea and vomiting. Negative for abdominal distention and blood in stool. Endocrine: Negative for cold intolerance and heat intolerance. Genitourinary: Negative for difficulty urinating and dysuria. Musculoskeletal: Negative for gait problem. Skin: Negative for rash. Neurological: Positive for dizziness, weakness and light-headedness. Negative for syncope and headaches. Hematological: Negative for adenopathy. Psychiatric/Behavioral: Negative for behavioral problems.        Past Medical History:   Diagnosis Date • Depression    • Depression 02/06/2019   • Ear problems    • Substance abuse [facility])    • Wrist fracture      History reviewed. No pertinent surgical history. Family History   Problem Relation Age of Onset   • Anemia Mother    • Hypotension Mother    • No Known Problems Father    • No Known Problems Sister    • No Known Problems Brother      Social History     Socioeconomic History   • Marital status: Single     Spouse name: None   • Number of children: None   • Years of education: None   • Highest education level: 11th grade   Occupational History   • None   Tobacco Use   • Smoking status: Never     Passive exposure: Yes   • Smokeless tobacco: Never   Vaping Use   • Vaping Use: Never used   Substance and Sexual Activity   • Alcohol use: Never   • Drug use: Never   • Sexual activity: Never     Partners: Male     Comment: cell [phone]   Other Topics Concern   • None   Social History Narrative   • None     Social Determinants of Health     Financial Resource Strain: Medium Risk (3/27/2019)    Overall Financial Resource Strain (CARDIA)    • Difficulty of Paying Living Expenses: Somewhat hard   Food Insecurity: No Food Insecurity (3/27/2019)    Hunger Vital Sign    • Worried About Running Out of Food in the Last Year: Never true    • Ran Out of Food in the Last Year: Never true   Transportation Needs: No Transportation Needs (3/27/2019)    PRAPARE - Transportation    • Lack of Transportation (Medical): No    • Lack of Transportation (Non-Medical): No   Physical Activity: Not on file   Stress: No Stress Concern Present (3/27/2019)    [address] [facility]    • Feeling of Stress :  Only a little   Social Connections: Socially Isolated (3/27/2019)    Social Connection and Isolation Panel [NHANES]    • Frequency of Communication with Friends and Family: More than three times a week    • Frequency of Social Gatherings with Friends and Family: More than three times a week    • Attends Sabianist Services: Never    • Active Member of Clubs or Organizations: Not on file    • Attends Club or Organization Meetings: Never    • Marital Status: Never    Intimate Partner Violence: Not At Risk (3/27/2019)    Humiliation, Afraid, Rape, and Kick questionnaire    • Fear of Current or Ex-Partner: No    • Emotionally Abused: No    • Physically Abused: No    • Sexually Abused: No   Housing Stability: Not on file     Current Outpatient Medications on File Prior to Visit   Medication Sig   • albuterol (ProAir HFA) 90 mcg/act inhaler Inhale 2 puffs every 6 (six) hours as needed for wheezing   • hydrOXYzine pamoate (VISTARIL) 25 mg capsule Take 1 capsule (25 mg total) by mouth 3 (three) times a day as needed for anxiety (Patient not taking: Reported on 6/20/2023)   • ibuprofen (MOTRIN) 200 mg tablet Take by mouth every 6 (six) hours as needed for mild pain (Patient not taking: Reported on 1/13/2023)   • naproxen (Naprosyn) 500 mg tablet Take 1 tablet (500 mg total) by mouth 2 (two) times a day with meals (Patient not taking: Reported on 1/13/2023)     No Known Allergies  Immunization History   Administered Date(s) Administered   • DTP 10/29/2003, 01/21/2004, 04/01/2004, 04/05/2005, 07/05/2006   • DTaP 5 04/15/2003, 10/29/2003, 01/21/2004, 04/01/2004, 07/05/2006   • HPV Quadrivalent 02/06/2013, 10/21/2014   • HPV9 10/05/2016   • Hep A, adult 07/25/2007, 09/25/2007   • Hep A, ped/adol, 2 dose 07/05/2006, 09/25/2007   • Hep B, adult 2002, 10/29/2003, 01/21/2004   • Hepatitis A 07/25/2006   • Hib (PRP-OMP) 10/29/2003, 04/05/2005   • IPV 10/29/2003, 01/21/2004, 04/01/2004, 07/05/2006   • Influenza, nasal 10/21/2014   • Influenza, seasonal, injectable 11/08/2012   • MMR 10/29/2003, 01/21/2004, 07/05/2006   • Meningococcal MCV4P 02/06/2013, 03/27/2019   • Meningococcal, Unknown Serogroups 02/06/2013   • Pneumococcal Conjugate PCV 7 01/21/2004, 04/05/2004   • Tdap 02/06/2013   • Tuberculin Skin Test-PPD Intradermal 04/05/2005, 09/25/2007   • Varicella 01/21/2004, 09/25/2007       Objective     /78 (BP Location: Left arm, Patient Position: Sitting, Cuff Size: Standard)   Pulse 81   Temp 97.9 °F (36.6 °C) (Tympanic)   Resp 16   Ht 5' 4" (1.626 m)   Wt 52.3 kg (115 lb 6.4 oz)   SpO2 100%   BMI 19.81 kg/m²     Physical Exam  Vitals and nursing note reviewed. Constitutional:       General: She is not in acute distress. Appearance: Normal appearance. HENT:      Head: Normocephalic and atraumatic. Right Ear: External ear normal.      Left Ear: External ear normal.   Eyes:      Conjunctiva/sclera: Conjunctivae normal.   Cardiovascular:      Rate and Rhythm: Normal rate and regular rhythm. Heart sounds: Normal heart sounds. Pulmonary:      Effort: Pulmonary effort is normal.      Breath sounds: Normal breath sounds. Abdominal:      General: Bowel sounds are normal.      Palpations: Abdomen is soft. Tenderness: There is no abdominal tenderness. There is no guarding or rebound. Musculoskeletal:         General: Normal range of motion. Cervical back: Normal range of motion. Skin:     General: Skin is warm and dry. Neurological:      Mental Status: She is alert and oriented to person, place, and time.    Psychiatric:         Mood and Affect: Mood normal.         Behavior: Behavior normal.       REJI Santamaria

## 2023-08-30 NOTE — ASSESSMENT & PLAN NOTE
- Likely viral gastroenteritis. - Continue Zofran as needed. - Increase oral hydration.   - BRAT diet. - Contact office if no improvement.

## 2023-09-15 ENCOUNTER — TELEPHONE (OUTPATIENT)
Dept: FAMILY MEDICINE CLINIC | Facility: CLINIC | Age: 23
End: 2023-09-15

## 2025-01-24 ENCOUNTER — TELEPHONE (OUTPATIENT)
Dept: FAMILY MEDICINE CLINIC | Facility: CLINIC | Age: 25
End: 2025-01-24

## 2025-05-15 ENCOUNTER — TELEPHONE (OUTPATIENT)
Dept: FAMILY MEDICINE CLINIC | Facility: CLINIC | Age: 25
End: 2025-05-15